# Patient Record
Sex: FEMALE | Race: BLACK OR AFRICAN AMERICAN | NOT HISPANIC OR LATINO | Employment: OTHER | URBAN - METROPOLITAN AREA
[De-identification: names, ages, dates, MRNs, and addresses within clinical notes are randomized per-mention and may not be internally consistent; named-entity substitution may affect disease eponyms.]

---

## 2017-08-02 ENCOUNTER — HISTORICAL (OUTPATIENT)
Dept: WOUND CARE | Facility: HOSPITAL | Age: 37
End: 2017-08-02

## 2017-08-02 LAB — B-HCG FREE SERPL-ACNC: 2 MIU/ML

## 2018-03-27 ENCOUNTER — HOSPITAL ENCOUNTER (OUTPATIENT)
Dept: ONCOLOGY | Facility: HOSPITAL | Age: 38
End: 2018-03-29
Attending: INTERNAL MEDICINE | Admitting: INTERNAL MEDICINE

## 2018-03-27 LAB
ABS NEUT (OLG): 6.28 X10(3)/MCL (ref 2.1–9.2)
ALBUMIN SERPL-MCNC: 3.5 GM/DL (ref 3.4–5)
ALBUMIN/GLOB SERPL: 1.1 RATIO (ref 1.1–2)
ALP SERPL-CCNC: 74 UNIT/L (ref 38–126)
ALT SERPL-CCNC: 14 UNIT/L (ref 12–78)
AMPHET UR QL SCN: NORMAL
APTT PPP: 29.6 SECOND(S) (ref 24.8–36.9)
AST SERPL-CCNC: 12 UNIT/L (ref 15–37)
BARBITURATE SCN PRESENT UR: NORMAL
BASOPHILS # BLD AUTO: 0 X10(3)/MCL (ref 0–0.2)
BASOPHILS NFR BLD AUTO: 0 %
BENZODIAZ UR QL SCN: NORMAL
BILIRUB SERPL-MCNC: 0.3 MG/DL (ref 0.2–1)
BILIRUBIN DIRECT+TOT PNL SERPL-MCNC: 0.1 MG/DL (ref 0–0.5)
BILIRUBIN DIRECT+TOT PNL SERPL-MCNC: 0.2 MG/DL (ref 0–0.8)
BNP BLD-MCNC: <5 PG/ML (ref 0–125)
BUN SERPL-MCNC: 10 MG/DL (ref 7–18)
CALCIUM SERPL-MCNC: 9.1 MG/DL (ref 8.5–10.1)
CANNABINOIDS UR QL SCN: NORMAL
CHLORIDE SERPL-SCNC: 100 MMOL/L (ref 98–107)
CO2 SERPL-SCNC: 30 MMOL/L (ref 21–32)
COCAINE UR QL SCN: NORMAL
CREAT SERPL-MCNC: 0.88 MG/DL (ref 0.55–1.02)
D DIMER PPP IA.FEU-MCNC: 763 NG/ML FEU (ref 0–500)
EOSINOPHIL # BLD AUTO: 0.2 X10(3)/MCL (ref 0–0.9)
EOSINOPHIL NFR BLD AUTO: 3 %
ERYTHROCYTE [DISTWIDTH] IN BLOOD BY AUTOMATED COUNT: 13.5 % (ref 11.5–17)
GLOBULIN SER-MCNC: 3.1 GM/DL (ref 2.4–3.5)
GLUCOSE SERPL-MCNC: 143 MG/DL (ref 74–106)
HCT VFR BLD AUTO: 38.6 % (ref 37–47)
HGB BLD-MCNC: 12.4 GM/DL (ref 12–16)
INR PPP: 0.96 (ref 0–1.27)
LYMPHOCYTES # BLD AUTO: 1.8 X10(3)/MCL (ref 0.6–4.6)
LYMPHOCYTES NFR BLD AUTO: 20 %
MCH RBC QN AUTO: 26.1 PG (ref 27–31)
MCHC RBC AUTO-ENTMCNC: 32.1 GM/DL (ref 33–36)
MCV RBC AUTO: 81.1 FL (ref 80–94)
MONOCYTES # BLD AUTO: 0.6 X10(3)/MCL (ref 0.1–1.3)
MONOCYTES NFR BLD AUTO: 7 %
NEUTROPHILS # BLD AUTO: 6.28 X10(3)/MCL (ref 1.4–7.9)
NEUTROPHILS NFR BLD AUTO: 70 %
OPIATES UR QL SCN: NORMAL
PCP UR QL: NORMAL
PH UR STRIP.AUTO: 6.5 [PH] (ref 5–7.5)
PLATELET # BLD AUTO: 231 X10(3)/MCL (ref 130–400)
PMV BLD AUTO: 10.1 FL (ref 9.4–12.4)
POC TROPONIN: 0 NG/ML (ref 0–0.08)
POTASSIUM SERPL-SCNC: 3.6 MMOL/L (ref 3.5–5.1)
PROT SERPL-MCNC: 6.6 GM/DL (ref 6.4–8.2)
PROTHROMBIN TIME: 13.1 SECOND(S) (ref 12.2–14.7)
RBC # BLD AUTO: 4.76 X10(6)/MCL (ref 4.2–5.4)
SODIUM SERPL-SCNC: 136 MMOL/L (ref 136–145)
SP GR FLD REFRACTOMETRY: 1.01 (ref 1–1.03)
WBC # SPEC AUTO: 8.9 X10(3)/MCL (ref 4.5–11.5)

## 2018-12-20 ENCOUNTER — HOSPITAL ENCOUNTER (OUTPATIENT)
Dept: NUTRITION | Facility: HOSPITAL | Age: 38
End: 2018-12-22
Attending: INTERNAL MEDICINE | Admitting: INTERNAL MEDICINE

## 2018-12-20 LAB
ABS NEUT (OLG): 6.32 X10(3)/MCL (ref 2.1–9.2)
ALBUMIN SERPL-MCNC: 3.2 GM/DL (ref 3.4–5)
ALBUMIN/GLOB SERPL: 0.9 RATIO (ref 1.1–2)
ALP SERPL-CCNC: 76 UNIT/L (ref 38–126)
ALT SERPL-CCNC: 28 UNIT/L (ref 12–78)
AMPHET UR QL SCN: NORMAL
AST SERPL-CCNC: 24 UNIT/L (ref 15–37)
B-HCG SERPL QL: NEGATIVE
BARBITURATE SCN PRESENT UR: NORMAL
BASOPHILS # BLD AUTO: 0 X10(3)/MCL (ref 0–0.2)
BASOPHILS NFR BLD AUTO: 0 %
BENZODIAZ UR QL SCN: NORMAL
BILIRUB SERPL-MCNC: 0.3 MG/DL (ref 0.2–1)
BILIRUBIN DIRECT+TOT PNL SERPL-MCNC: 0.1 MG/DL (ref 0–0.5)
BILIRUBIN DIRECT+TOT PNL SERPL-MCNC: 0.2 MG/DL (ref 0–0.8)
BNP BLD-MCNC: <15 PG/ML (ref 0–100)
BNP BLD-MCNC: <15 PG/ML (ref 0–100)
BUN SERPL-MCNC: 5 MG/DL (ref 7–18)
CALCIUM SERPL-MCNC: 8.9 MG/DL (ref 8.5–10.1)
CANNABINOIDS UR QL SCN: NORMAL
CHLORIDE SERPL-SCNC: 100 MMOL/L (ref 98–107)
CO2 SERPL-SCNC: 27 MMOL/L (ref 21–32)
COCAINE UR QL SCN: NORMAL
CREAT SERPL-MCNC: 0.82 MG/DL (ref 0.55–1.02)
EOSINOPHIL # BLD AUTO: 0.2 X10(3)/MCL (ref 0–0.9)
EOSINOPHIL NFR BLD AUTO: 3 %
ERYTHROCYTE [DISTWIDTH] IN BLOOD BY AUTOMATED COUNT: 13.8 % (ref 11.5–17)
GLOBULIN SER-MCNC: 3.4 GM/DL (ref 2.4–3.5)
GLUCOSE SERPL-MCNC: 169 MG/DL (ref 74–106)
HCT VFR BLD AUTO: 39.2 % (ref 37–47)
HGB BLD-MCNC: 12.5 GM/DL (ref 12–16)
LIPASE SERPL-CCNC: 44 UNIT/L (ref 73–393)
LYMPHOCYTES # BLD AUTO: 1 X10(3)/MCL (ref 0.6–4.6)
LYMPHOCYTES NFR BLD AUTO: 12 %
MCH RBC QN AUTO: 25.9 PG (ref 27–31)
MCHC RBC AUTO-ENTMCNC: 31.9 GM/DL (ref 33–36)
MCV RBC AUTO: 81.2 FL (ref 80–94)
MONOCYTES # BLD AUTO: 0.3 X10(3)/MCL (ref 0.1–1.3)
MONOCYTES NFR BLD AUTO: 3 %
NEUTROPHILS # BLD AUTO: 6.32 X10(3)/MCL (ref 2.1–9.2)
NEUTROPHILS NFR BLD AUTO: 81 %
OPIATES UR QL SCN: NORMAL
PCP UR QL: NORMAL
PH UR STRIP.AUTO: 6 [PH] (ref 5–7.5)
PLATELET # BLD AUTO: 273 X10(3)/MCL (ref 130–400)
PMV BLD AUTO: 9.9 FL (ref 9.4–12.4)
POTASSIUM SERPL-SCNC: 3.8 MMOL/L (ref 3.5–5.1)
PROT SERPL-MCNC: 6.6 GM/DL (ref 6.4–8.2)
RBC # BLD AUTO: 4.83 X10(6)/MCL (ref 4.2–5.4)
SODIUM SERPL-SCNC: 138 MMOL/L (ref 136–145)
SP GR FLD REFRACTOMETRY: 1.03 (ref 1–1.03)
WBC # SPEC AUTO: 7.8 X10(3)/MCL (ref 4.5–11.5)

## 2018-12-22 LAB
ABS NEUT (OLG): 8.01 X10(3)/MCL (ref 2.1–9.2)
ALBUMIN SERPL-MCNC: 3.1 GM/DL (ref 3.4–5)
ALBUMIN/GLOB SERPL: 1.2 {RATIO}
ALP SERPL-CCNC: 89 UNIT/L (ref 38–126)
ALT SERPL-CCNC: 19 UNIT/L (ref 12–78)
AST SERPL-CCNC: 6 UNIT/L (ref 15–37)
BASOPHILS # BLD AUTO: 0 X10(3)/MCL (ref 0–0.2)
BASOPHILS NFR BLD AUTO: 0 %
BILIRUB SERPL-MCNC: 0.2 MG/DL (ref 0.2–1)
BILIRUBIN DIRECT+TOT PNL SERPL-MCNC: 0.1 MG/DL (ref 0–0.2)
BILIRUBIN DIRECT+TOT PNL SERPL-MCNC: 0.1 MG/DL (ref 0–0.8)
BUN SERPL-MCNC: 15 MG/DL (ref 7–18)
CALCIUM SERPL-MCNC: 8.9 MG/DL (ref 8.5–10.1)
CHLORIDE SERPL-SCNC: 105 MMOL/L (ref 98–107)
CO2 SERPL-SCNC: 24 MMOL/L (ref 21–32)
CREAT SERPL-MCNC: 0.96 MG/DL (ref 0.55–1.02)
EOSINOPHIL # BLD AUTO: 0 X10(3)/MCL (ref 0–0.9)
EOSINOPHIL NFR BLD AUTO: 0 %
ERYTHROCYTE [DISTWIDTH] IN BLOOD BY AUTOMATED COUNT: 14.5 % (ref 11.5–17)
EST. AVERAGE GLUCOSE BLD GHB EST-MCNC: 183 MG/DL
GLOBULIN SER-MCNC: 2.6 GM/DL (ref 2.4–3.5)
GLUCOSE SERPL-MCNC: 199 MG/DL (ref 74–106)
HBA1C MFR BLD: 8 % (ref 4.2–6.3)
HCT VFR BLD AUTO: 34.5 % (ref 37–47)
HGB BLD-MCNC: 10.7 GM/DL (ref 12–16)
LYMPHOCYTES # BLD AUTO: 2.5 X10(3)/MCL (ref 0.6–4.6)
LYMPHOCYTES NFR BLD AUTO: 22 %
MCH RBC QN AUTO: 25.6 PG (ref 27–31)
MCHC RBC AUTO-ENTMCNC: 31 GM/DL (ref 33–36)
MCV RBC AUTO: 82.5 FL (ref 80–94)
MONOCYTES # BLD AUTO: 0.7 X10(3)/MCL (ref 0.1–1.3)
MONOCYTES NFR BLD AUTO: 6 %
NEUTROPHILS # BLD AUTO: 8.01 X10(3)/MCL (ref 2.1–9.2)
NEUTROPHILS NFR BLD AUTO: 71 %
PLATELET # BLD AUTO: 269 X10(3)/MCL (ref 130–400)
PMV BLD AUTO: 10.8 FL (ref 9.4–12.4)
POTASSIUM SERPL-SCNC: 3.7 MMOL/L (ref 3.5–5.1)
PROT SERPL-MCNC: 5.7 GM/DL (ref 6.4–8.2)
RBC # BLD AUTO: 4.18 X10(6)/MCL (ref 4.2–5.4)
SODIUM SERPL-SCNC: 138 MMOL/L (ref 136–145)
WBC # SPEC AUTO: 11.3 X10(3)/MCL (ref 4.5–11.5)

## 2019-01-13 ENCOUNTER — HOSPITAL ENCOUNTER (OUTPATIENT)
Dept: OCCUPATIONAL THERAPY | Facility: HOSPITAL | Age: 39
End: 2019-01-15
Attending: INTERNAL MEDICINE | Admitting: INTERNAL MEDICINE

## 2019-01-13 LAB
ABS NEUT (OLG): 4.23 X10(3)/MCL (ref 2.1–9.2)
ALBUMIN SERPL-MCNC: 3.4 GM/DL (ref 3.4–5)
ALBUMIN/GLOB SERPL: 1 {RATIO}
ALP SERPL-CCNC: 76 UNIT/L (ref 38–126)
ALT SERPL-CCNC: 23 UNIT/L (ref 12–78)
AST SERPL-CCNC: 15 UNIT/L (ref 15–37)
BASOPHILS # BLD AUTO: 0 X10(3)/MCL (ref 0–0.2)
BASOPHILS NFR BLD AUTO: 0 %
BILIRUB SERPL-MCNC: 0.3 MG/DL (ref 0.2–1)
BILIRUBIN DIRECT+TOT PNL SERPL-MCNC: 0.1 MG/DL (ref 0–0.2)
BILIRUBIN DIRECT+TOT PNL SERPL-MCNC: 0.2 MG/DL (ref 0–0.8)
BNP BLD-MCNC: <15 PG/ML (ref 0–100)
BUN SERPL-MCNC: 5 MG/DL (ref 7–18)
CALCIUM SERPL-MCNC: 8.4 MG/DL (ref 8.5–10.1)
CHLORIDE SERPL-SCNC: 105 MMOL/L (ref 98–107)
CO2 SERPL-SCNC: 26 MMOL/L (ref 21–32)
CREAT SERPL-MCNC: 0.86 MG/DL (ref 0.55–1.02)
EOSINOPHIL # BLD AUTO: 0.3 X10(3)/MCL (ref 0–0.9)
EOSINOPHIL NFR BLD AUTO: 4 %
ERYTHROCYTE [DISTWIDTH] IN BLOOD BY AUTOMATED COUNT: 14.3 % (ref 11.5–17)
GLOBULIN SER-MCNC: 3.5 GM/DL (ref 2.4–3.5)
GLUCOSE SERPL-MCNC: 153 MG/DL (ref 74–106)
HCO3 UR-SCNC: 21.7 MMOL/L (ref 22–26)
HCT VFR BLD AUTO: 38.1 % (ref 37–47)
HGB BLD-MCNC: 12.1 GM/DL (ref 12–16)
LYMPHOCYTES # BLD AUTO: 2.2 X10(3)/MCL (ref 0.6–4.6)
LYMPHOCYTES NFR BLD AUTO: 30 %
MCH RBC QN AUTO: 25.9 PG (ref 27–31)
MCHC RBC AUTO-ENTMCNC: 31.8 GM/DL (ref 33–36)
MCV RBC AUTO: 81.4 FL (ref 80–94)
MONOCYTES # BLD AUTO: 0.6 X10(3)/MCL (ref 0.1–1.3)
MONOCYTES NFR BLD AUTO: 8 %
NEUTROPHILS # BLD AUTO: 4.23 X10(3)/MCL (ref 2.1–9.2)
NEUTROPHILS NFR BLD AUTO: 57 %
O2 HGB ARTERIAL: 97.2 % (ref 94–97)
PCO2 BLDA: 26 MMHG (ref 35–45)
PH SMN: 7.53 [PH] (ref 7.35–7.45)
PLATELET # BLD AUTO: 271 X10(3)/MCL (ref 130–400)
PMV BLD AUTO: 10.6 FL (ref 9.4–12.4)
PO2 BLDA: 134 MMHG (ref 80–100)
POC ALLENS TEST: ABNORMAL
POC BE: 0.1 (ref -2–3)
POC CAO2: 16.9 ML/DL (ref 15.7–21.6)
POC CO HGB: 1.4 %
POC CO2: 22.5 MMOL/L (ref 22–27)
POC IONIZED CALCIUM: 1.13 MMOL/L (ref 1.12–1.23)
POC MET HGB: 1.2 % (ref 0.4–1.5)
POC SAMPLESOURCE: ABNORMAL
POC SATURATED O2: 99.3 % (ref 96–97)
POC SITE: ABNORMAL
POC THB: 12.2 GM/DL (ref 12–16)
POC TREATMENT: ABNORMAL
POC TROPONIN: 0 NG/ML (ref 0–0.08)
POTASSIUM BLD-SCNC: 3 MMOL/L (ref 3.6–5)
POTASSIUM SERPL-SCNC: 3.2 MMOL/L (ref 3.5–5.1)
PROT SERPL-MCNC: 6.9 GM/DL (ref 6.4–8.2)
RBC # BLD AUTO: 4.68 X10(6)/MCL (ref 4.2–5.4)
SETTING 1 ABG: ABNORMAL
SETTING 2 ABG: ABNORMAL
SETTING 3 ABG: ABNORMAL
SETTING 4 ABG: ABNORMAL
SODIUM BLD-SCNC: 137 MMOL/L (ref 137–145)
SODIUM SERPL-SCNC: 139 MMOL/L (ref 136–145)
WBC # SPEC AUTO: 7.4 X10(3)/MCL (ref 4.5–11.5)

## 2019-01-14 LAB
ALBUMIN SERPL-MCNC: 3.3 GM/DL (ref 3.4–5)
ALBUMIN/GLOB SERPL: 0.9 RATIO (ref 1.1–2)
ALP SERPL-CCNC: 74 UNIT/L (ref 38–126)
ALT SERPL-CCNC: 27 UNIT/L (ref 12–78)
AST SERPL-CCNC: 14 UNIT/L (ref 15–37)
BILIRUB SERPL-MCNC: 0.2 MG/DL (ref 0.2–1)
BILIRUBIN DIRECT+TOT PNL SERPL-MCNC: 0.1 MG/DL (ref 0–0.5)
BILIRUBIN DIRECT+TOT PNL SERPL-MCNC: 0.1 MG/DL (ref 0–0.8)
BUN SERPL-MCNC: 8 MG/DL (ref 7–18)
CALCIUM SERPL-MCNC: 9.2 MG/DL (ref 8.5–10.1)
CHLORIDE SERPL-SCNC: 106 MMOL/L (ref 98–107)
CO2 SERPL-SCNC: 23 MMOL/L (ref 21–32)
CREAT SERPL-MCNC: 0.86 MG/DL (ref 0.55–1.02)
ERYTHROCYTE [DISTWIDTH] IN BLOOD BY AUTOMATED COUNT: 14.4 % (ref 11.5–17)
GLOBULIN SER-MCNC: 3.5 GM/DL (ref 2.4–3.5)
GLUCOSE SERPL-MCNC: 224 MG/DL (ref 74–106)
H PYLORI AB SER IA-ACNC: NEGATIVE
HCT VFR BLD AUTO: 38 % (ref 37–47)
HGB BLD-MCNC: 11.5 GM/DL (ref 12–16)
MCH RBC QN AUTO: 25.6 PG (ref 27–31)
MCHC RBC AUTO-ENTMCNC: 30.3 GM/DL (ref 33–36)
MCV RBC AUTO: 84.4 FL (ref 80–94)
PLATELET # BLD AUTO: 283 X10(3)/MCL (ref 130–400)
PMV BLD AUTO: 11.4 FL (ref 9.4–12.4)
POTASSIUM SERPL-SCNC: 4.5 MMOL/L (ref 3.5–5.1)
PROT SERPL-MCNC: 6.8 GM/DL (ref 6.4–8.2)
RBC # BLD AUTO: 4.5 X10(6)/MCL (ref 4.2–5.4)
SODIUM SERPL-SCNC: 138 MMOL/L (ref 136–145)
WBC # SPEC AUTO: 10.2 X10(3)/MCL (ref 4.5–11.5)

## 2019-01-15 LAB
BUN SERPL-MCNC: 14 MG/DL (ref 7–18)
CALCIUM SERPL-MCNC: 9.9 MG/DL (ref 8.5–10.1)
CHLORIDE SERPL-SCNC: 105 MMOL/L (ref 98–107)
CO2 SERPL-SCNC: 26 MMOL/L (ref 21–32)
CREAT SERPL-MCNC: 0.97 MG/DL (ref 0.55–1.02)
CREAT/UREA NIT SERPL: 14.4
GLUCOSE SERPL-MCNC: 194 MG/DL (ref 74–106)
POTASSIUM SERPL-SCNC: 4.5 MMOL/L (ref 3.5–5.1)
SODIUM SERPL-SCNC: 140 MMOL/L (ref 136–145)

## 2019-03-31 ENCOUNTER — HOSPITAL ENCOUNTER (OUTPATIENT)
Dept: MEDSURG UNIT | Facility: HOSPITAL | Age: 39
End: 2019-04-02
Attending: INTERNAL MEDICINE | Admitting: INTERNAL MEDICINE

## 2019-03-31 LAB
ABS NEUT (OLG): 4.52 X10(3)/MCL (ref 2.1–9.2)
ALBUMIN SERPL-MCNC: 3.4 GM/DL (ref 3.4–5)
ALBUMIN/GLOB SERPL: 1 RATIO (ref 1.1–2)
ALP SERPL-CCNC: 106 UNIT/L (ref 38–126)
ALT SERPL-CCNC: 17 UNIT/L (ref 12–78)
APPEARANCE, UA: ABNORMAL
AST SERPL-CCNC: 10 UNIT/L (ref 15–37)
B-HCG FREE SERPL-ACNC: <1 MIU/ML
B-HCG SERPL QL: NEGATIVE
BACTERIA SPEC CULT: ABNORMAL /HPF
BASOPHILS # BLD AUTO: 0 X10(3)/MCL (ref 0–0.2)
BASOPHILS NFR BLD AUTO: 0 %
BILIRUB SERPL-MCNC: 0.2 MG/DL (ref 0.2–1)
BILIRUB UR QL STRIP: ABNORMAL
BILIRUBIN DIRECT+TOT PNL SERPL-MCNC: 0.1 MG/DL (ref 0–0.5)
BILIRUBIN DIRECT+TOT PNL SERPL-MCNC: 0.1 MG/DL (ref 0–0.8)
BUN SERPL-MCNC: 6 MG/DL (ref 7–18)
CALCIUM SERPL-MCNC: 8.4 MG/DL (ref 8.5–10.1)
CHLORIDE SERPL-SCNC: 107 MMOL/L (ref 98–107)
CO2 SERPL-SCNC: 24 MMOL/L (ref 21–32)
COLOR UR: ABNORMAL
CREAT SERPL-MCNC: 0.89 MG/DL (ref 0.55–1.02)
EOSINOPHIL # BLD AUTO: 0.3 X10(3)/MCL (ref 0–0.9)
EOSINOPHIL NFR BLD AUTO: 4 %
ERYTHROCYTE [DISTWIDTH] IN BLOOD BY AUTOMATED COUNT: 14.8 % (ref 11.5–17)
GLOBULIN SER-MCNC: 3.5 GM/DL (ref 2.4–3.5)
GLUCOSE (UA): NEGATIVE
GLUCOSE SERPL-MCNC: 147 MG/DL (ref 74–106)
HCT VFR BLD AUTO: 37.9 % (ref 37–47)
HGB BLD-MCNC: 11.9 GM/DL (ref 12–16)
HGB UR QL STRIP: ABNORMAL
KETONES UR QL STRIP: NEGATIVE
LEUKOCYTE ESTERASE UR QL STRIP: ABNORMAL
LYMPHOCYTES # BLD AUTO: 1.4 X10(3)/MCL (ref 0.6–4.6)
LYMPHOCYTES NFR BLD AUTO: 20 %
MCH RBC QN AUTO: 25.8 PG (ref 27–31)
MCHC RBC AUTO-ENTMCNC: 31.4 GM/DL (ref 33–36)
MCV RBC AUTO: 82 FL (ref 80–94)
MONOCYTES # BLD AUTO: 0.4 X10(3)/MCL (ref 0.1–1.3)
MONOCYTES NFR BLD AUTO: 6 %
MUCOUS THREADS URNS QL MICRO: SLIGHT
NEUTROPHILS # BLD AUTO: 4.52 X10(3)/MCL (ref 2.1–9.2)
NEUTROPHILS NFR BLD AUTO: 68 %
NITRITE UR QL STRIP: NEGATIVE
PH UR STRIP: 5 [PH] (ref 5–9)
PLATELET # BLD AUTO: 214 X10(3)/MCL (ref 130–400)
PMV BLD AUTO: 10.9 FL (ref 9.4–12.4)
POTASSIUM SERPL-SCNC: 3.5 MMOL/L (ref 3.5–5.1)
PROT SERPL-MCNC: 6.9 GM/DL (ref 6.4–8.2)
PROT UR QL STRIP: ABNORMAL
RBC # BLD AUTO: 4.62 X10(6)/MCL (ref 4.2–5.4)
RBC #/AREA URNS HPF: >200 /HPF
SODIUM SERPL-SCNC: 137 MMOL/L (ref 136–145)
SP GR UR STRIP: 1.02 (ref 1–1.03)
SQUAMOUS EPITHELIAL, UA: ABNORMAL
UA WBC MAN: ABNORMAL /HPF
UROBILINOGEN UR STRIP-ACNC: 0.2
WBC # SPEC AUTO: 6.6 X10(3)/MCL (ref 4.5–11.5)

## 2019-04-01 LAB
ABS NEUT (OLG): 5.01 X10(3)/MCL (ref 2.1–9.2)
BASOPHILS # BLD AUTO: 0 X10(3)/MCL (ref 0–0.2)
BASOPHILS NFR BLD AUTO: 0 %
BUN SERPL-MCNC: 7 MG/DL (ref 7–18)
CALCIUM SERPL-MCNC: 9.2 MG/DL (ref 8.5–10.1)
CHLORIDE SERPL-SCNC: 105 MMOL/L (ref 98–107)
CO2 SERPL-SCNC: 22 MMOL/L (ref 21–32)
CREAT SERPL-MCNC: 1.09 MG/DL (ref 0.55–1.02)
CREAT/UREA NIT SERPL: 6.4
ERYTHROCYTE [DISTWIDTH] IN BLOOD BY AUTOMATED COUNT: 14.7 % (ref 11.5–17)
GLUCOSE SERPL-MCNC: 297 MG/DL (ref 74–106)
HCT VFR BLD AUTO: 38.5 % (ref 37–47)
HGB BLD-MCNC: 11.9 GM/DL (ref 12–16)
LYMPHOCYTES # BLD AUTO: 0.4 X10(3)/MCL (ref 0.6–4.6)
LYMPHOCYTES NFR BLD AUTO: 7 %
MCH RBC QN AUTO: 25.4 PG (ref 27–31)
MCHC RBC AUTO-ENTMCNC: 30.9 GM/DL (ref 33–36)
MCV RBC AUTO: 82.3 FL (ref 80–94)
MONOCYTES # BLD AUTO: 0 X10(3)/MCL (ref 0.1–1.3)
MONOCYTES NFR BLD AUTO: 1 %
NEUTROPHILS # BLD AUTO: 5.01 X10(3)/MCL (ref 2.1–9.2)
NEUTROPHILS NFR BLD AUTO: 92 %
PLATELET # BLD AUTO: 214 X10(3)/MCL (ref 130–400)
PMV BLD AUTO: 11.6 FL (ref 9.4–12.4)
POTASSIUM SERPL-SCNC: 4.8 MMOL/L (ref 3.5–5.1)
RBC # BLD AUTO: 4.68 X10(6)/MCL (ref 4.2–5.4)
SODIUM SERPL-SCNC: 137 MMOL/L (ref 136–145)
WBC # SPEC AUTO: 5.5 X10(3)/MCL (ref 4.5–11.5)

## 2019-04-03 LAB — FINAL CULTURE: NORMAL

## 2020-03-02 ENCOUNTER — HOSPITAL ENCOUNTER (OUTPATIENT)
Dept: OCCUPATIONAL THERAPY | Facility: HOSPITAL | Age: 40
End: 2020-03-03
Attending: INTERNAL MEDICINE | Admitting: INTERNAL MEDICINE

## 2020-03-02 LAB
ABS NEUT (OLG): 4.16 X10(3)/MCL (ref 2.1–9.2)
ALBUMIN SERPL-MCNC: 3.2 GM/DL (ref 3.4–5)
ALBUMIN/GLOB SERPL: 0.9 RATIO (ref 1.1–2)
ALP SERPL-CCNC: 89 UNIT/L (ref 38–126)
ALT SERPL-CCNC: 19 UNIT/L (ref 12–78)
AMYLASE SERPL-CCNC: 57 UNIT/L (ref 25–115)
APPEARANCE, UA: CLEAR
AST SERPL-CCNC: 20 UNIT/L (ref 15–37)
B-HCG SERPL QL: NEGATIVE
BACTERIA SPEC CULT: ABNORMAL /HPF
BASOPHILS # BLD AUTO: 0 X10(3)/MCL (ref 0–0.2)
BASOPHILS NFR BLD AUTO: 1 %
BILIRUB SERPL-MCNC: 0.3 MG/DL (ref 0.2–1)
BILIRUB UR QL STRIP: NEGATIVE
BILIRUBIN DIRECT+TOT PNL SERPL-MCNC: 0.1 MG/DL (ref 0–0.5)
BILIRUBIN DIRECT+TOT PNL SERPL-MCNC: 0.2 MG/DL (ref 0–0.8)
BUN SERPL-MCNC: 13 MG/DL (ref 7–18)
CALCIUM SERPL-MCNC: 8.7 MG/DL (ref 8.5–10.1)
CHLORIDE SERPL-SCNC: 106 MMOL/L (ref 98–107)
CO2 SERPL-SCNC: 24 MMOL/L (ref 21–32)
COLOR UR: YELLOW
CREAT SERPL-MCNC: 0.86 MG/DL (ref 0.55–1.02)
EOSINOPHIL # BLD AUTO: 0.3 X10(3)/MCL (ref 0–0.9)
EOSINOPHIL NFR BLD AUTO: 4 %
ERYTHROCYTE [DISTWIDTH] IN BLOOD BY AUTOMATED COUNT: 15.9 % (ref 11.5–17)
GLOBULIN SER-MCNC: 3.7 GM/DL (ref 2.4–3.5)
GLUCOSE (UA): NEGATIVE
GLUCOSE SERPL-MCNC: 165 MG/DL (ref 74–106)
HCT VFR BLD AUTO: 38.2 % (ref 37–47)
HGB BLD-MCNC: 11.5 GM/DL (ref 12–16)
HGB UR QL STRIP: NEGATIVE
KETONES UR QL STRIP: NEGATIVE
LEUKOCYTE ESTERASE UR QL STRIP: NEGATIVE
LIPASE SERPL-CCNC: 81 UNIT/L (ref 73–393)
LYMPHOCYTES # BLD AUTO: 2.1 X10(3)/MCL (ref 0.6–4.6)
LYMPHOCYTES NFR BLD AUTO: 30 %
MCH RBC QN AUTO: 24.9 PG (ref 27–31)
MCHC RBC AUTO-ENTMCNC: 30.1 GM/DL (ref 33–36)
MCV RBC AUTO: 82.7 FL (ref 80–94)
MONOCYTES # BLD AUTO: 0.5 X10(3)/MCL (ref 0.1–1.3)
MONOCYTES NFR BLD AUTO: 7 %
NEUTROPHILS # BLD AUTO: 4.16 X10(3)/MCL (ref 2.1–9.2)
NEUTROPHILS NFR BLD AUTO: 58 %
NITRITE UR QL STRIP: NEGATIVE
PH UR STRIP: 6 [PH] (ref 5–9)
PLATELET # BLD AUTO: 219 X10(3)/MCL (ref 130–400)
PMV BLD AUTO: 10.9 FL (ref 9.4–12.4)
POTASSIUM SERPL-SCNC: 3.8 MMOL/L (ref 3.5–5.1)
PROT SERPL-MCNC: 6.9 GM/DL (ref 6.4–8.2)
PROT UR QL STRIP: NEGATIVE
RBC # BLD AUTO: 4.62 X10(6)/MCL (ref 4.2–5.4)
RBC #/AREA URNS HPF: ABNORMAL /[HPF]
SODIUM SERPL-SCNC: 137 MMOL/L (ref 136–145)
SP GR UR STRIP: 1.02 (ref 1–1.03)
SQUAMOUS EPITHELIAL, UA: ABNORMAL
UROBILINOGEN UR STRIP-ACNC: 1
WBC # SPEC AUTO: 7.1 X10(3)/MCL (ref 4.5–11.5)
WBC #/AREA URNS HPF: ABNORMAL /[HPF]

## 2020-03-03 LAB
ABS NEUT (OLG): 4.77 X10(3)/MCL (ref 2.1–9.2)
ALBUMIN SERPL-MCNC: 3.3 GM/DL (ref 3.4–5)
ALBUMIN/GLOB SERPL: 1.1 RATIO (ref 1.1–2)
ALP SERPL-CCNC: 133 UNIT/L (ref 38–126)
ALT SERPL-CCNC: 174 UNIT/L (ref 12–78)
AST SERPL-CCNC: 167 UNIT/L (ref 15–37)
BASOPHILS # BLD AUTO: 0 X10(3)/MCL (ref 0–0.2)
BASOPHILS NFR BLD AUTO: 0 %
BILIRUB SERPL-MCNC: 0.4 MG/DL (ref 0.2–1)
BILIRUBIN DIRECT+TOT PNL SERPL-MCNC: 0.1 MG/DL (ref 0–0.5)
BILIRUBIN DIRECT+TOT PNL SERPL-MCNC: 0.3 MG/DL (ref 0–0.8)
BUN SERPL-MCNC: 7 MG/DL (ref 7–18)
CALCIUM SERPL-MCNC: 8.9 MG/DL (ref 8.5–10.1)
CHLORIDE SERPL-SCNC: 105 MMOL/L (ref 98–107)
CO2 SERPL-SCNC: 28 MMOL/L (ref 21–32)
CREAT SERPL-MCNC: 0.78 MG/DL (ref 0.55–1.02)
EOSINOPHIL # BLD AUTO: 0.1 X10(3)/MCL (ref 0–0.9)
EOSINOPHIL NFR BLD AUTO: 1 %
ERYTHROCYTE [DISTWIDTH] IN BLOOD BY AUTOMATED COUNT: 15.9 % (ref 11.5–17)
GLOBULIN SER-MCNC: 3.1 GM/DL (ref 2.4–3.5)
GLUCOSE SERPL-MCNC: 117 MG/DL (ref 74–106)
HCT VFR BLD AUTO: 35.8 % (ref 37–47)
HGB BLD-MCNC: 10.7 GM/DL (ref 12–16)
LYMPHOCYTES # BLD AUTO: 1 X10(3)/MCL (ref 0.6–4.6)
LYMPHOCYTES NFR BLD AUTO: 16 %
MCH RBC QN AUTO: 24.7 PG (ref 27–31)
MCHC RBC AUTO-ENTMCNC: 29.9 GM/DL (ref 33–36)
MCV RBC AUTO: 82.7 FL (ref 80–94)
MONOCYTES # BLD AUTO: 0.4 X10(3)/MCL (ref 0.1–1.3)
MONOCYTES NFR BLD AUTO: 6 %
NEUTROPHILS # BLD AUTO: 4.77 X10(3)/MCL (ref 2.1–9.2)
NEUTROPHILS NFR BLD AUTO: 76 %
PLATELET # BLD AUTO: 232 X10(3)/MCL (ref 130–400)
PMV BLD AUTO: 10.8 FL (ref 9.4–12.4)
POTASSIUM SERPL-SCNC: 3.7 MMOL/L (ref 3.5–5.1)
PROT SERPL-MCNC: 6.4 GM/DL (ref 6.4–8.2)
RBC # BLD AUTO: 4.33 X10(6)/MCL (ref 4.2–5.4)
SODIUM SERPL-SCNC: 139 MMOL/L (ref 136–145)
WBC # SPEC AUTO: 6.3 X10(3)/MCL (ref 4.5–11.5)

## 2020-03-04 LAB — FINAL CULTURE: NORMAL

## 2022-04-11 ENCOUNTER — HISTORICAL (OUTPATIENT)
Dept: ADMINISTRATIVE | Facility: HOSPITAL | Age: 42
End: 2022-04-11

## 2022-04-25 VITALS
DIASTOLIC BLOOD PRESSURE: 90 MMHG | WEIGHT: 293 LBS | BODY MASS INDEX: 51.91 KG/M2 | SYSTOLIC BLOOD PRESSURE: 143 MMHG | HEIGHT: 63 IN
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
## 2022-04-29 NOTE — H&P
"Hospitalist      Patient:   Danyelle Chi            MRN: [id number]            FIN: [id number]               Age:   38 years     Sex:  Female     :  1980   Associated Diagnoses:   None   Author:   Danyelle Bradford MD      Basic Information   Time Seen:  Date & Time 2018 15:06:00.    Referral source:  Emergency department.    History limitation:  None.    Advance directive:  None.    Provider information/ cc:  Primary care:  Chuy Langford DO       Chief Complaint   SOB, Abdominal pain,       History of Present Illness   Ms. Chi is a 38 yr old AAF who presented to the ED with c/o abdominal pain. Reports a 2 day history of progressively worsening SOB, productive cough (yellow sputum) and wheezing. Usual home breathing treaments not working. Also reports a 2-3 week history of abdominal pain,  nausea and vomiting. Abdominal pain is intermittent, described as "throbbing" and without aggravating or alleviating factors. Reports 6-8 episodes of nonbloody vomiting per day. Denies diarrhea or constipation. Denies any fever or chills. B/P was elevated in ED at 171/118 but better after getting hydralazine. Also received Solumedrol, Mg sulfate, phenergan, and toradol. Continues to have some wheezing and c/o abdominal pain. Appears comfortable at this time. Playing on phone while obtaining history. Labs unremarkable. Chest xray clear.       Review of Systems   Except as documented, all other systems reviewed and negative      Health Status   Current medications:  (Selected)   Inpatient Medications  Ordered  Normal Saline (0.9% NS) IV 1,000 mL: 1,000 mL, 1,000 mL, IV, 85 mL/hr, start date 18 15:29:00 CST  Zofran 2 mg/mL injectable solution: 4 mg, form: Injection, IV Push, q4hr PRN for nausea, first dose 18 15:30:00 CST  albuterol 0.083% inhalation solution: 2.5 mg, form: Soln-Inh, NEB, q4hr Resp, first dose 18 16:00:00 CST  cloniDINE 0.2 mg oral tablet: 0.2 mg, form: Tab, Oral, Once, " first dose 11/30/16 15:13:00 CST, stop date 11/30/16 15:13:00 CST, STAT, 24  methylPREDNISolone sodium succinate 125 mg injection: 60 mg, form: Injection, IV Push, q8hr, first dose 12/20/18 22:00:00 CST  Prescriptions  Prescribed  Carafate 1 g/10 mL oral suspension: 1 gm = 10 mL, Oral, QID, # 560 mL, 0 Refill(s), Pharmacy: [id number]  omeprazole 40 mg oral DR capsule: 40 mg = 1 cap(s), Oral, Daily, # 30 cap(s), 0 Refill(s), Pharmacy: [id number]  Documented Medications  Documented  ALBUTEROL SULFATE .083 % NEBU: 2.5 mg = 3 mL, NEB, q6hr, PRN PRN as needed for wheezing  Arnuity Ellipta: INH, q24hr, 0 Refill(s)  amlodipine 10 mg oral tablet: 10 mg = 1 tab(s), Oral, Daily, # 30 tab(s), 0 Refill(s)     DID NOT BRING MEDS. WITH HER PERMISSION I CALLED HER PHARMACY AND SHE IS ON THE ABOVE MEDS. NORVASC NOT FILLED RECENTLY.       Histories     Past Medical History: Asthma, HTN, Morbid obesity  Past Surgical History: D&C  Family History: Negative in regards to HPI.  Social History:  No alcohol, tobacco or illicit drug use. LMP one month ago but otherwise none in last year. G14, P7, multiple miscarriages.           Physical Examination      Vital Signs (last 24 hrs)_____  Last Charted___________  Temp Oral     37.0 DegC  (DEC 20 10:17)  Heart Rate Peripheral   H 109bpm  (DEC 20 14:30)  Resp Rate         21 br/min  (DEC 20 14:30)  SBP      H 173mmHg  (DEC 20 14:30)  DBP      H 115mmHg  (DEC 20 14:30)  SpO2      96 %  (DEC 20 14:30)   General:  Alert and oriented, No acute distress, morbidly obese.    Cognition and Speech:  Oriented, Speech clear and coherent.    HENT:  Normocephalic, Normal hearing, Oral mucosa is moist.    Eye:  Pupils are equal, round and reactive to light, Normal conjunctiva.    Neck:  Supple, No carotid bruit, No jugular venous distention.    Respiratory:  Respirations are non-labored, Breath sounds are equal, bilateral wheezing.    Cardiovascular:  Normal rate, Regular rhythm, No  murmur, No edema.    Gastrointestinal:  Soft, Non-tender, Non-distended, Normal bowel sounds.    Integumentary:  Warm, Dry, Intact.    Musculoskeletal:  Normal strength, No tenderness, No swelling.    Neurologic:  Alert, Oriented, Normal sensory, No focal deficits.    Psychiatric:  Cooperative, Appropriate mood & affect, Normal judgment.       Review / Management   Laboratory Results   Today's Lab Results : PowerNote Discrete Results   12/20/2018 11:49 CST     POC BNP iSTAT             <15 pg/mL    12/20/2018 11:44 CST     WBC                       7.8 x10(3)/mcL                             Hgb                       12.5 gm/dL                             Hct                       39.2 %                             Platelet                  273 x10(3)/mcL                             MCV                       81.2 fL                             Sodium Lvl                138 mmol/L                             Potassium Lvl             3.8 mmol/L                             Chloride                  100 mmol/L                             CO2                       27.0 mmol/L                             Calcium Lvl               8.9 mg/dL                             Glucose Lvl               169 mg/dL  HI                             BUN                       5.0 mg/dL  LOW                             Creatinine                0.82 mg/dL                             Bili Total                0.3 mg/dL                             Bili Direct               0.10 mg/dL                             Bili Indirect             0.20 mg/dL                             AST                       24 unit/L                             ALT                       28 unit/L                             Alk Phos                  76 unit/L                             Albumin Lvl               3.20 gm/dL  LOW                   * Final Report *    Reason For Exam  Pneumonia    Radiology Report     CHEST X-RAYS (2 Views):     CPT: [id number]      HISTORY:  Pneumonia     FINDINGS:  Examination reveals mediastinal and cardiac silhouettes to be within  normal limits. Lung fields are clear and free of gross infiltrates  atelectases or effusions.     IMPRESSION: No active pulmonary disease       Signature Line  Electronically Signed By: Dimas Torres MD  Date/Time Signed: 12/20/2018 11:48         Impression and Plan     Asthma exacerbation   - Continue Solumedrol 60mg IV q 6 hr    Abdominal pain, nausea, vomiting  - Will keep NPO for now. If UPT negative will consider CT of abdomen/pelvis with contrast. Body habitus will likely interfere with results of an US.   - UDS pending    HTN - uncontrolled  - Will restart Norvasc 10mg daily  - Medication compliance reinforced    I, Kayley Borjas NP, discussed this case with Dr. IFRAH Bradford.     Patient seen and examined at 1715.  I have reviewed the documentation as above and agree with the plan.  Patient indicates that she is taken many different things to try to help with her abdominal pain but nothing is really helping.  She denies any problems with her bowel or bladder.  Exam as above with minimal wheezing on her lung exam and some moderate tenderness in the periumbilical region.    We will continue as above.  We will go ahead and give her some pain medication as well as a diet.  I indicated that she needed to back off of eating if she were to have any additional nausea and vomiting.  She indicates that she may have a history of gastroparesis although I do not see evidence of that.  I do see several visits in the past for abdominal pain that is similar.

## 2022-04-29 NOTE — DISCHARGE SUMMARY
"   Patient:   Danyelle Chi            MRN: [id number]            FIN: [id number]               Age:   38 years     Sex:  Female     :  1980   Associated Diagnoses:   None   Author:   Thanh Landaverde MD      Discharge Information      Discharge Summary Information   Admit/Discharge Dates   Admit Date: 2018  Discharge Date: 2018   Physicians   Attending Physician - Danyelle Bradford MD    Attending Physician - Akhil BOWEN, Thanh  Primary Care Physician - Chuy Langford DO   Discharge Diagnosis   Mild Asthma exacerbation: resolved      Abdominal pain: Non specific : resolved      HTN, benign    DM2, non insulin dependent    Morbid obesity      Procedures   No procedures recorded for this visit.   Immunizations   No immunizations recorded for this visit.   Discharge Medications   Prescribed  metFORMIN (metformin 500 mg oral tablet) 500 mg, Oral, BID  Continue  albuterol (ALBUTEROL SULFATE .083 % NEBU) 2.5 mg, NEB, q6hr, PRN as needed for wheezing  amLODIPine (amlodipine 10 mg oral tablet) 10 mg, Oral, Daily  fluticasone (Arnuity Ellipta), INH, q24hr  sucralfate (Carafate 1 g/10 mL oral suspension) 1 gm, Oral, QID  Discontinue  omeprazole (omeprazole 40 mg oral DR capsule) 40 mg, Oral, Daily      Education   Discharge - 18 8:11:00 CST, Home, Give all scheduled vaccinations prior to discharge.   Discharge Activity - Activity as Tolerated   Discharge Diet - Diabetic       Followup   Chuy Langford DO, within 1 to 2 weeks      Hospital Course   Hospital Course   Ms. Chi is a 38 yr old AAF who presented to the ED with c/o abdominal pain.  Abdominal pain is intermittent, described as "throbbing" and without aggravating or alleviating factors. Reports 6-8 episodes of nonbloody vomiting per day. Denies diarrhea or constipation. Reports a 2 day history of progressively worsening SOB, productive cough (yellow sputum) and wheezing. Usual home breathing treaments not working. Also " reports a 2-3 week history of abdominal pain,  nausea and vomiting. Denies any fever or chills. B/P was elevated in ED at 171/118 but better after getting hydralazine. Also received Solumedrol, Mg sulfate, phenergan, and toradol. Continues to have some wheezing and c/o abdominal pain. Appears comfortable at this time. Playing on phone while obtaining history. Labs unremarkable. Chest xray clear.   Pt was admitted to regular floor and started on iv filuds, IV steroids and bronchodilators. Her respiratory symptoms improved but she continued to C/O abdominal pain. Physical exam was inconclusive. So we got a CT abdomen done. Came back negative for acute changes. She started to tolerate po diet. She has been stable and asymptomatic. DC to home today. Informed pt about her new diagnosis of DM. HbA1c was 8. Started on Metformin 500 mg po BID and DM education was given.  Diet: DM  Meds: per dc med rec  F/U with PCP in 1 to 2 weeks   For further questions contact hospitalist office  DC 31 mts    .        Physical Examination   General:  Alert and oriented, No acute distress, Obese.    Respiratory:  Lungs are clear to auscultation, Breath sounds are equal.    Cardiovascular:  Normal rate, Regular rhythm, No murmur.    Gastrointestinal:  Soft, Non-tender, Non-distended, Normal bowel sounds.    Neurologic:  Alert, Oriented, No focal deficits, Cranial Nerves II-XII are grossly intact.       Discharge Plan   Education and Follow-up   Counseled: patient, regarding diagnosis, regarding treatment, regarding medications.

## 2022-04-29 NOTE — DISCHARGE SUMMARY
Patient:   Danyelle Chi            MRN: [id number]            FIN: [id number]               Age:   38 years     Sex:  Female     :  1980   Associated Diagnoses:   None   Author:   Scottie Holland MD      Admit Date: 2019  Discharge Date: 01/15/2019    PHYSICIANS  Attending Physician - Caroline BOWEN, Martine Ferrara  Attending Physician - Scottie Holland MD  Admitting Physician - Caroline BOWEN, Martine Ferrara  Primary Care Physician - No PCP, No    DISCHARGE DIAGNOSIS  Persistent asthma with acute exacerbation  Dysphagia to solids  NIDDM II  Accelerated HTN    PROCEDURES  No procedures recorded for this visit.    HOSPITAL COURSE  38-year-old -American female with a history of persistent asthma presented to the ED via EMS with complaints of shortness of breath, difficulty breathing.  Patient was seen in the ED on 1/10 for similar complaints, subsequently discharged home on steroids, which she states she completed.  Patient returned to ED on  again with SOB.  She did have significant wheezing, and was on supplemental oxygen for a short time in ED, though its was weaned off by time of admission, and she was saturating adequately on room air.  Chest x-ray revealed no evidence of acute disease.  Patient was admitted to hospital medicine services for further management.  She was continued on steroids and bronchodilators.  She was stable saturating well on room air.  She complained of chronic dysphagia, with worsening recently, and mostly with solids.  For this we peroformed an MBS which noted  Esophageal reflux to the level of the thoracic inlet.  Likely related to her frequent use of steroids for asthma.  She was started on PPI BID.  She was stable for discharge.  She was sent on short course of steroids.  She can follow up further with PCP.    STATUS  Improved    DISPOSITION  Discharge to home    DIET  [place]    ACTIVITY  As tolerated    IMMUNIZATIONS  01/15/2019 - influenza virus vaccine, inactivated      FOLLOW-UP  PCP, within 1 week   Call for followup appointment.  Hospital Follow-up.  If patient has no PCP please have CM assist him in setting this up.  Bhupendra Eason, on 01/22/2019    DISCHARGE MEDICATION RECONCILIATION  Prescribed  albuterol (albuterol 2.5 mg/3 mL (0.083%) inhalation solution) 2.5 mg, NEB, q6hr, PRN as needed for wheezing  hydroxyzine (Vistaril 50 mg oral capsule) 50 mg, Oral, QID, PRN as needed for anxiety  methylprednisolone (Medrol 4 mg oral tablet) 1 packet(s), Oral, Once  methylprednisolone (Medrol 4 mg oral tablet) 1 packet(s), Oral, Once  pantoprazole (Protonix 40 mg ORAL enteric coated tablet) 40 mg, Oral, BID  Continue  amlodipine (amlodipine 10 mg oral tablet) 10 mg, Oral, Daily  diphenhydramine (Benadryl)  fluticasone (Arnuity Ellipta), INH, q24hr  metformin (metformin 500 mg oral tablet) 500 mg, Oral, BID  montelukast (Singulair 10 mg oral TABLET) 10 mg, Oral, Daily  ondansetron (Zofran 4 mg oral tablet) 4 mg, Oral, q8hr, PRN as needed for nausea/vomiting  Discontinue  Template Non-Formulary Med    PHYSICAL EXAM  Temperature  36.8  (07:00)  Systolic Blood Pressure 163  (07:00)  Diastolic Blood Pressure No result  Pulse   86  (09:59)  SpO2   97  (07:00)  Respiratory Rate  20  (09:59)    GENERAL: awake and in no acute distress  LUNGS: Good air movement bilaterally, minimal end expiratory wheezing, no rhonchi  CVS: Widely tachycardic, S1-S2 positive, no murmurs  ABD: Soft, non-tender, bowel sounds present  EXTREMITIES: peripheral pulses 2+, no pitting edema  NEURO: AAOx3  PSYCHIATRIC: Cooperative      Time spent on discharge 35 minutes
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
## 2022-04-29 NOTE — DISCHARGE SUMMARY
Patient:   Danyelle Chi            MRN: [id number]            FIN: [id number]               Age:   39 years     Sex:  Female     :  1980   Associated Diagnoses:   None   Author:   Thao BOWEN, Mikie      Results Review   SEE TODAY'S PN      Physical Examination      Vital Signs (last 24 hrs)_____  Last Charted___________  Temp Oral     36.6 DegC  (MAR 03 07:35)  Heart Rate Peripheral   90 bpm  (MAR 03 07:35)  Resp Rate         20 br/min  (MAR 03 07:35)  SBP      H 175mmHg  (MAR 03 07:35)  DBP      H 97mmHg  (MAR 03 07:35)  SpO2      99 %  (MAR 03 07:35)  Weight      153 kg  (MAR 02 12:20)  Height      161 cm  (MAR 02 12:20)  BMI      59.03  (MAR 02 12:20)

## 2022-04-29 NOTE — H&P
Patient:   Danyelle Chi            MRN: [id number]            FIN: [id number]               Age:   38 years     Sex:  Female     :  1980   Associated Diagnoses:   None   Author:   Martine Velazquez MD      Basic Information   Source of history:  Self, Family member, Medical record.    Present at bedside:  Medical personnel.    Referral source:  Emergency department.    History limitation:  None.    Provider information/ cc:    PCP: [facility]  ADVANCED DIRECTIVES: NONE  CODE STATUS: FULL.       Chief Complaint   2019 4:57 CST       SOB, PT WAS HERE 2 DAYS AGO FOR SAME, INITAL SP02 96% WITH RR 38      History of Present Illness   38-year-old -American female presents to the ED via EMS with complaints of shortness of breath, difficulty breathing, and acute asthma exacerbation.  Patient reports she was seen in the ED 2 days ago for similar complaints, evaluated workup done and subsequently discharged home.  Patient reports her symptoms progressively got worse which prompted return to ED for further evaluation.  Asked medical history includes asthma, HTN, DM, tobacco abuse. Potassium 3.2, glucose 153; other indices unremarkable.  Chest x-ray done revealed no evidence of acute disease.  Patient was noted to be hypertensive, tachypnea, with significant amount of wheezing on presentation which she did require supplemental O2 therapy, but now has been weaned off.  She did receive nebulizer treatments, magnesium, and IV steroid therapy in the ED which provided some relief, however she still remained with wheezing and difficulty breathing.  Patient is admitted to hospital medicine services for further management.  VS /104 pulse 103 respirations 18 temperature 36.6°C O2 saturation 98% on room air      Review of Systems   Except as document, all other systems reviewed and negative      Health Status   Allergies:    Allergic Reactions (Selected)  Severity Not Documented  Iodine containing  compounds- Throat swelling.  Mushroom- Throat swelling.  Shellfish- Allergy.   Current medications:  (Selected)   Inpatient Medications  Ordered  DuoNeb: 3 mL, form: Soln, NEB, q4hr PRN for shortness of breath or wheezing, first dose 01/13/19 12:41:00 CST  Fioricet oral tablet: 1 tab(s), form: Tab, Oral, q4hr PRN for headache, first dose 01/13/19 11:40:00 CST  Sodium Chloride 0.9% intravenous solution 1,000 mL: 1,000 mL, 1,000 mL, IV, 125 mL/hr, start date 01/13/19 7:04:00 CST  Vistaril: 50 mg, form: Cap, Oral, q8hr PRN for as needed for anxiety, first dose 01/13/19 12:40:00 CST  acetaminophen: 650 mg, form: Tab, Oral, q6hr PRN for fever, first dose 01/13/19 7:04:00 CST  cloniDINE 0.2 mg oral tablet: 0.2 mg, form: Tab, Oral, Once, first dose 11/30/16 15:13:00 CST, stop date 11/30/16 15:13:00 CST, STAT, 24  methylPREDNISolone sodium succinate 125 mg preservative-free injection: 60 mg, form: Injection, IV Push, q8hr, first dose 01/13/19 8:00:00 CST  Documented Medications  Documented  ALBUTEROL SULFATE .083 % NEBU: 2.5 mg = 3 mL, NEB, q6hr, PRN PRN as needed for wheezing  Arnuity Ellipta: INH, q24hr, 0 Refill(s)  Benadryl: 25MG-50MG PRN AND AT NIGHT PO, 0 Refill(s)  Singulair 10 mg oral TABLET: 10 mg = 1 tab(s), Oral, Daily, 0 Refill(s)  Template Non-Formulary Med: UNKNOWN ABX FOR UTI FOR 2 MORE DAYS, 0 Refill(s)  Vistaril: 50MG-100MG QID AND PRN PO FOR ANXIETY, 0 Refill(s)  Zofran 4 mg oral tablet: 4 mg = 1 tab(s), Oral, q8hr, PRN PRN as needed for nausea/vomiting, 0 Refill(s)  amlodipine 10 mg oral tablet: 10 mg = 1 tab(s), Oral, Daily, # 30 tab(s), 0 Refill(s)  metformin 500 mg oral tablet: 500 mg = 1 tab(s), Oral, BID, 0 Refill(s)      Histories   Past Medical History:   Asthma, HTN, DM, seizure in the past, headaches   Family History:   Mother-DM, HTN  Father-eczema, asthma   Procedure history:   Hernia repair ×2, cholecystectomy, D&C   Social History     Drinks alcohol socially  Denies any illicit drug  use  Smokes cigarettes socially  Lives with family.        Physical Examination      Vital Signs (last 24 hrs)_____  Last Charted___________  Heart Rate Peripheral   H 105bpm  (JAN 13 11:54)  Resp Rate         20 br/min  (JAN 13 11:54)  SBP      H 143mmHg  (JAN 13 11:00)  DBP      83 mmHg  (JAN 13 11:00)  SpO2      99 %  (JAN 13 11:54)  Weight      141 kg  (JAN 13 04:57)   General:  Alert and oriented, Mild distress, appears odler than stated age, obese, mild SOB noted, family at bedside.         Appearance: Morbidly obese.         Signs of distress: Difficulty breathing.    Eye:  Pupils are equal, round and reactive to light, Extraocular movements are intact, Vision unchanged.    HENT:  Normocephalic, Oral mucosa is moist, No pharyngeal erythema.    Neck:  Supple, Non-tender.    Respiratory:  Breath sounds are equal, Symmetrical chest wall expansion.         Respirations: Labored (mild).         Breath sounds: Bilateral, Lower lobe, Base, Diminished.         Breath sounds: Bilateral, Inspiratory wheezes, Expiratory wheezes.    Cardiovascular:  103  beats per minute, Regular rhythm, S1, S2, No gallop, Tachycardia, Normal peripheral perfusion.         Capillary refill: Less than 2 seconds.    Gastrointestinal:  Soft, Non-tender, Non-distended, Normal bowel sounds.         Abdomen: Obese.    Genitourinary:  No costovertebral angle tenderness.    Musculoskeletal:  Moves upper and lower extremities freely and on command; generalized weakness.    Integumentary:  Warm, Dry.    Neurologic:  Alert, Oriented, Normal sensory, Normal motor function, No focal deficits.    Psychiatric:  Cooperative, Appropriate mood & affect.    Cognition and Speech:  Speech clear and coherent.       Review / Management   Results review:     Labs (Last four charted values)  WBC                  7.4 (JAN 13)   Hgb                  12.1 (JAN 13)   Hct                  38.1 (JAN 13)   Plt                  271 (JAN 13)   Na                    139 (JAN 13)   K                    L 3.2 (JAN 13)   CO2                  26.0 (JAN 13)   Cl                   105 (JAN 13)   Cr                   0.86 (JAN 13)   BUN                  L 5.0 (JAN 13)   Glucose Random       H 153 (JAN 13) .    Laboratory Results   Today's Lab Results : PowerNote Discrete Results   1/13/2019 6:10 CST       Sample ABG                art                             Treatment                 room air                             Site                      Radial Lt                             pH Art                    7.530  HI                             pCO2 Art                  26.0 mmHg  LOW                             pO2 Art                   134.0 mmHg  HI                             HCO3 Art                  21.7 mmol/L  LOW                             CO2 Totl Art              22.5 mmol/L                             O2 Sat Art                99.3 %  HI                             D base                    0.1                             THB ABG                   12.2 gm/dL                             CO Hgb                    1.4 %  NA                             Met Hgb Art               1.2 %                             O2 Hgb                    97.2 %  HI                             CaO2                      16.9 mL/dL                             Ionized Calcium           1.13 mmol/L                             Sodium RT                 137.0 mmol/L                             Potassium RT              3.00 mmol/L  LOW                             Allens                    N/A                             Setting 1 ABG             room air                             Setting 2 ABG             -                             Setting 3 ABG             -                             Setting 4 ABG             -    1/13/2019 5:14 CST       POC BNP iSTAT             <15 pg/mL    1/13/2019 5:13 CST       POC Troponin              0.00 ng/mL    1/13/2019 5:04 CST       WBC                        7.4 x10(3)/mcL                             RBC                       4.68 x10(6)/mcL                             Hgb                       12.1 gm/dL                             Hct                       38.1 %                             Platelet                  271 x10(3)/mcL                             MCV                       81.4 fL                             MCH                       25.9 pg  LOW                             MCHC                      31.8 gm/dL  LOW                             RDW                       14.3 %                             MPV                       10.6 fL                             Abs Neut                  4.23 x10(3)/mcL                             Neutro Auto               57 %  NA                             Lymph Auto                30 %  NA                             Mono Auto                 8 %  NA                             Eos Auto                  4 %  NA                             Abs Eos                   0.3 x10(3)/mcL                             Basophil Auto             0 %  NA                             Abs Neutro                4.23 x10(3)/mcL                             Abs Lymph                 2.2 x10(3)/mcL                             Abs Mono                  0.6 x10(3)/mcL                             Abs Baso                  0.0 x10(3)/mcL                             Sodium Lvl                139 mmol/L                             Potassium Lvl             3.2 mmol/L  LOW                             Chloride                  105 mmol/L                             CO2                       26.0 mmol/L                             Calcium Lvl               8.4 mg/dL  LOW                             Glucose Lvl               153 mg/dL  HI                             BUN                       5.0 mg/dL  LOW                             Creatinine                0.86 mg/dL                             eGFR-AA                   >60 mL/min/1.73 m2  NA                              eGFR-MAX                  >60 mL/min/1.73 m2  NA                             Bili Total                0.3 mg/dL                             Bili Direct               0.10 mg/dL                             Bili Indirect             0.20 mg/dL                             AST                       15 unit/L                             ALT                       23 unit/L                             Alk Phos                  76 unit/L                             Total Protein             6.9 gm/dL                             Albumin Lvl               3.40 gm/dL                             Globulin                  3.50 gm/dL                             A/G Ratio                 1.0  NA        Chest x-ray results   Reviewed radiologist's report,     CXR 1 view  Reason For Exam  Congestion    Radiology Report  Indication: Congestion     Findings: Compared to 12/20/2018. Heart size is normal and lungs are  clear bilaterally. Pulmonary vasculature is normal.     IMPRESSION: No evidence of acute disease.       Signature Line  Electronically Signed By: Shira BOWEN, Blake RG  Date/Time Signed: 01/13/2019 08:08   Cardiac Monitor:  Rate  103 beats per minute.    Documentation reviewed:  Reviewed prior records, Reviewed home medications.    Condition:  Fair.       Impression and Plan   Diagnosis       IMPRESSION:  Respiratory failure - with hypoxia, resolving  Acute asthma exacerbation  Wheezing  Exertional dyspnea  HTN- accelerated  DM type II non-insulin dependent- uncontrolled  Tobacco abuse  Weakness    PLAN:  Scheduled neb treatments. Supplemental Oxygen therapy. Monitor pulse oximetry. Labs in the am. Resume home medication as deemed necessary. Nicotine patch daily if desires. PRN anti-hypertensive per parameters. VS q 4 hours. Continue with IV steroid therapy. Accuchecks with sliding scale. GI/DVT prophylaxis      FULL CODE STATUS  PCP: Evelyn LENZ APRN, FNP, discussed the  case with Dr. Martine Velazquez. .     Orders     Orders   Patient Care:  Unconscious patient: Blood glucose <70, give 1 mg glucagon IM, and place IV access; move to protocol above. (Order): 1/13/2019 12:32 CST, Unconscious patient: Blood glucose <70, give 1 mg glucagon IM, and place IV access; move to protocol above.  Unconscious patient: Repeat BG every 15 - 20 minutes following episode until BG > 70 per protocol. (Order): 1/13/2019 12:32 CST, Unconscious patient: Repeat BG every 15 - 20 minutes following episode until BG > 70 per protocol.  Unconscious patient: Blood glucose <50 mg/dl, give 50 ml of D50W IVP and repeat as ordered until patient wakes up or BG > 70 mg/dl. (Order): 1/13/2019 12:32 CST, Unconscious patient: Blood glucose <50 mg/dl, give 50 ml of D50W IVP and repeat as ordered until patient wakes up or BG > 70 mg/dl.  Unconscious patient: Blood glucose 50 - 69, give 25 ml of D50W IVP and repeat as ordered until patient wakes up or BG > 70mg/dl. (Order): 1/13/2019 12:32 CST, Unconscious patient: Blood glucose 50 - 69, give 25 ml of D50W IVP and repeat as ordered until patient wakes up or BG > 70mg/dl.  Unconscious patient: Blood glucose 70 - 100 mg/dl, give 25 ml of D50W IVP but look for other source of altered mental status. (Order): 1/13/2019 12:32 CST, Unconscious patient: Blood glucose 70 - 100 mg/dl, give 25 ml of D50W IVP but look for other source of altered mental status.  Conscious patient: For blood glucose <70 repeat BG every 15-20 minutes and treat again if hypoglycemia persists. (Order): 1/13/2019 12:32 CST, Conscious patient: For blood glucose <70 repeat BG every 15-20 minutes and treat again if hypoglycemia persists.  Conscious patient: Blood glucose <45 mg/dl and IV access, give 25 ml D50W IVP. If no IV access available, 1 mg glucagon IM and 15 grams of fast-acting carbohydrate. Notify treating MD for further orders. (Order): 1/13/2019 12:32 CST, Conscious patient: Blood glucose <45  mg/dl and IV access, give 25 ml D50W IVP. If no IV access available, 1 mg glucagon IM and 15 grams of fast-acting carbohydrate. Notify treating MD for further orders.  Conscious patient: Blood glucose 45-69 mg/dl; give 15 gm of fast acting carbs (4 oz. juice, regular soft drink, glucose tabs). Rationale: Low blood sugar; patient needs fast acting sugar. (Order): 1/13/2019 12:32 CST, Conscious patient: Blood glucose 45-69 mg/dl; give 15 gm of fast acting carbs (4 oz. juice, regular soft drink, glucose tabs). Rationale: Low blood sugar; patient needs fast acting sugar.  Conscious patient: Blood glucose  mg/dl, having symptoms, give light snack such as mat crackers and milk. Rationale: Symptomatic patient needs protein and carbs for stability. (Order): 1/13/2019 12:32 CST, Conscious patient: Blood glucose  mg/dl, having symptoms, give light snack such as mat crackers and milk. Rationale: Symptomatic patient needs protein and carbs for stability.  Peripheral IV Insertion (Order): 1/13/2019 12:32 CST  Blood glucose equal to or greater than 400 (Order): 1/13/2019 12:32 CST, Blood glucose equal to or greater than 400  Blood glucose equal to or less than 60 (Order): 1/13/2019 12:32 CST, Blood glucose equal to or less than 60  Do not arbitrarily hold insulin without calling physician (Order): 1/13/2019 12:32 CST, Do not arbitrarily hold insulin without calling physician  Blood Glucose Monitoring POC (Order): 1/13/2019 12:32 CST, AC & HS, 1/13/2019 16:00 CST  Pharmacy:  glucagon (Order): 1 mg, IM, q10min PRN for blood glucose, first dose 1/13/2019 12:32 CST, Unconscious patient: Patient with NO IV access available and BG < 70 mg/dl.  Dextrose 50% and Water  (50 mL vial/syringe) (Order): 50 mL, IV Push, As Directed PRN for blood glucose, Infuse over: 5 minute(s), first dose 1/13/2019 12:32 CST, Unconscious patient: Repeat as ordered per protocol.  Dextrose 50% and Water  (50 mL vial/syringe) (Order): 25 mL,  IV Push, As Directed PRN for blood glucose, Infuse over: 5 minute(s), first dose 1/13/2019 12:32 CST, Unconscious patient: Repeat as ordered per protocol.  Dextrose 50% and Water  (50 mL vial/syringe) (Order): 25 mL, IV Push, Once PRN for blood glucose, Infuse over: 5 minute(s), first dose 1/13/2019 12:32 CST, Unconscious patient: Look for other source of altered mental status.  glucagon (Order): 1 mg, IM, q10min PRN for blood glucose, first dose 1/13/2019 12:32 CST, Conscious Patient with NO IV access available and BG < 45 mg/dl.  Dextrose 50% in Water intravenous solution (Order): 25 mL, IV Push, As Directed PRN for blood glucose, Infuse over: 5 minute(s), first dose 1/13/2019 12:32 CST, Conscious patient.  insulin lispro (Order): 2-14 units, Subcutaneous, As Directed PRN for blood glucose, first dose 1/13/2019 12:32 CST.     Orders   Laboratory:  CMP (Order): Routine collect, 1/14/2019 5:00 CST, Blood, Lab Collect, 1/14/2019 5:00 CST  CBC wo/Diff (Order): Routine collect, 1/14/2019 5:00 CST, Blood, Lab Collect, 1/14/2019 5:00 CST.     Education and Follow-up:       Counseled: Patient, Family, Regarding diagnosis, Regarding treatment, Regarding medications.       Professional Services   I, Dr. Martine Velazquez assumed care of the patient on 1/13/2019 at 1300 hrs.    For this patient encounter, I reviewed the midlevel provider Ms. Felix Solorzano's documentation, treatment plan, and medical decision making, and I had face-to-face time with this patient.  History was reviewed in detail.  Patient was still in the ED at the time of my visit.  Her  was at bedside at the time.  Laying in bed and complaining of shortness of breath.  She was also complaining of a headache and had received a dose of Toradol and Tylenol however without any relief.  She states that she usually gets headaches when she is put on steroids.  Advised that I will order some Fioricet    History: Reviewed and noted in detail    Physical Exam:  "Agree with above    Medical decision Making:   Continue Solu-Medrol with neb treatments will be changed to duo nebs from albuterol  Fioricet as needed for headaches  Insulin sliding scale with Accu-Cheks before meals and at bedtime  Patient does not admit to any smoking or alcohol however  tells me later that she has multiple personalities and calls it" different spirits, stating that 1 of her spirits likes to smoke, Chau and drink".  He is requesting for pastoral services to try and barragan the spirits off.    Case discussed with the nurse practitioner and agree with the rest of the assessment and plan stated above   "

## 2022-04-29 NOTE — DISCHARGE SUMMARY
Patient:   Danyelle Chi            MRN: [id number]            FIN: [id number]               Age:   38 years     Sex:  Female     :  1980   Associated Diagnoses:   None   Author:   Mikie Osuna MD      Discharge Information      Discharge Summary Information   Admitted  3/31/2019   Discharged  2019   Admitting physician     Mikie Osuna MD.     Admitting diagnosis (1. ASTHMA EXACERBATION  2. VAGINAL BLEEDING   3. TACHYCARDIA  4. PREDIABETS  5. OBESITY  6. ANXIETY  7. HIRSUTISM)      Physical Examination   General:  Alert and oriented.    Eye:  Pupils are equal, round and reactive to light, Extraocular movements are intact, Normal conjunctiva, Vision unchanged.    HENT:  Normocephalic, Normal hearing, Oral mucosa is moist, No pharyngeal erythema.    Neck:  Supple, Non-tender, No carotid bruit.    Respiratory:  Lungs are clear to auscultation, Respirations are non-labored, Breath sounds are equal, No chest wall tenderness.    Cardiovascular:  Normal rate, Regular rhythm, No murmur, No gallop.    Gastrointestinal:  Soft, Non-tender, Non-distended, Normal bowel sounds, No organomegaly.    Genitourinary:  No costovertebral angle tenderness, No inguinal tenderness, No urethral discharge.       Vital Signs (last 24 hrs)_____  Last Charted___________  Temp Oral     37 DegC  ( 07:29)  Heart Rate Peripheral   100 bpm  ( 08:36)  Resp Rate         20 br/min  ( 08:26)  SBP      138 mmHg  ( 07:29)  DBP      H 94mmHg  ( 07:29)  SpO2      95 %  ( 08:26)   Lymphatics:  No lymphadenopathy neck, axilla, groin.    Musculoskeletal:  Normal range of motion, Normal strength.    Neurologic:  Alert, Oriented, Normal sensory, Normal motor function, No focal deficits, Cranial Nerves II-XII are grossly intact.    Psychiatric:  Cooperative, Appropriate mood & affect, Normal judgment, Non-suicidal.       Hospital Course   Chief Complaint   3/31/2019 20:44 CDT      C/O ABD PAIN X 3  DAYS. SPOTTING TODAY. ALSO C/O SOB AND HEADACHE. ALBUTEROL AND FENTANYL 100 MCG PER EMS      History of Present Illness   37 Y/O AAF RECENTLY ESTABLISHED CARE WITH THE GROUP WITH MEDICAL HISTORY INCLUDING PREDIABETES, HTN, OBESITY, ASTHMA, ANXIETY PRESENTED TO THE ED WITH COMPLAINTS OF WORSENING WHEEZE AND SOB AND TACHYCARDIC AND TACHYPNEIC ON ARRIVAL NECCESITATING ADMISION FOR ASTHMA EXACERBATION ON DUONEBS AND IV STEROIDS, REPORTED TO HAVE ABDOMINAL PAIN WITH VAGINAL BLEEDING AND URIN EPREGNANCY POS AT HOME AND SERUM HCG NEGATIVE IN THE ED.      1. ASTHMA EXACERBATION  2. VAGINAL BLEEDING   3. TACHYCARDIA  4. PREDIABETS  5. OBESITY  6. ANXIETY  7. HIRSUTISM    PLAN :  IV STEROIDS AND DUONEBS  RESUME HOME MEDS  VAGINAL US  SCDS DFOR DVT PPX    VAGINAL US NORMAL  BREATHING BETTER  DC HOME ON PO STEROIDS  DC TIME 32 MIN
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
## 2022-04-29 NOTE — H&P
Patient:   Danyelle Chi            MRN: [id number]            FIN: [id number]               Age:   37 years     Sex:  Female     :  1980   Associated Diagnoses:   None   Author:   Bj Monteiro MD      Basic Information   Time Seen:  Date & Time 3/27/2018 13:15:00.    Source of history:  Self.    Referral source:  Emergency department.    History limitation:  None.    Advance directive:  None.    Provider information/ cc:  Primary care:  Chuy Langford DO       Chief Complaint   SOB, cough      History of Present Illness   Ms. Chi is a 37 year old morbidly obese AAF with a history of asthma. Reports a 7 day history of SOB and nonproductive cough. Denies fever or chills. Pt was admitted to [facility] twice since onset of symptoms, treated with antibiotics, corticosteroids, and bronchodilators with no relief of symptoms. Labs today are unremarkable. Chest CT shows nothing acute and is negative for PE. Today received IV corticosteroids and bronchodilators in ED. Feels better but continues to have some persistent wheezing.      Review of Systems   Except as documented, all other systems were reviewed and are negative      Health Status   Current medications:  (Selected)   Inpatient Medications  Ordered  NS (0.9% Sodium Chloride) Infusion 500 mL: 500 mL, 500 mL, IV, 500 mL/hr, start date 18 9:02:00 CDT  cloniDINE 0.2 mg oral tablet: 0.2 mg, form: Tab, Oral, Once, first dose 16 15:13:00 CST, stop date 16 15:13:00 CST, STAT, 24  Documented Medications  Documented  ALBUTEROL SULFATE .083 % NEBU:   HYDROCO/APAP TAB 5-325MG:   HYDROXYZINE PAMOATE 50 MG CAPS: 50 mg = 1 cap(s), Oral, TID  METHYLPREDNISOLONE DOSE PACK 4 MG TBPK:   amlodipine 10 mg oral tablet: 10 mg = 1 tab(s), Oral, Daily, # 30 tab(s), 0 Refill(s)      Histories     Past Medical History: Asthma, HTN  Past Surgical History: D&C  Family History: Negative in regards to HPI.  Social History:  No alcohol, tobacco or illicit  drug use. LMP one year ago. G14, P7, multiple miscarriages.       Physical Examination      Vital Signs (last 24 hrs)_____  Last Charted___________  Heart Rate Peripheral   76 bpm  (MAR 27 12:30)  Resp Rate         18 br/min  (MAR 27 12:30)  SBP      H 151mmHg  (MAR 27 12:30)  DBP      H 106mmHg  (MAR 27 12:30)  SpO2      94 %  (MAR 27 12:30)     General:  Alert and oriented, No acute distress, obese.    Cognition and Speech:  Oriented, Speech clear and coherent.    HENT:  Normocephalic, Normal hearing, Oral mucosa is moist.    Eye:  Pupils are equal, round and reactive to light, Normal conjunctiva.    Neck:  Supple, No carotid bruit, No jugular venous distention.    Respiratory:  Respirations are non-labored, Breath sounds are equal, few expiratory wheezes.    Cardiovascular:  Normal rate, Regular rhythm, No murmur, No edema.    Gastrointestinal:  Soft, Non-tender, Non-distended, Normal bowel sounds.    Integumentary:  Warm, Dry, Intact.    Musculoskeletal:  Normal strength, No tenderness, No swelling.    Neurologic:  Alert, Oriented, Normal sensory, No focal deficits.    Psychiatric:  Cooperative, Appropriate mood & affect, Normal judgment.       Review / Management   Laboratory Results   Today's Lab Results : PowerNote Discrete Results   3/27/2018 9:22 CDT       POC Troponin              0.00 ng/mL    3/27/2018 9:07 CDT       WBC                       8.9 x10(3)/mcL                             Hgb                       12.4 gm/dL                             Hct                       38.6 %                             Platelet                  231 x10(3)/mcL                             MCV                       81.1 fL                             MCH                       26.1 pg  LOW                             PT                        13.1 second(s)                             INR                       0.96                             PTT                       29.6 second(s)                             D-Dimer                    763 ng/ml FEU  HI                             Sodium Lvl                136 mmol/L                             Potassium Lvl             3.6 mmol/L                             Chloride                  100 mmol/L                             CO2                       30.0 mmol/L                             Calcium Lvl               9.1 mg/dL                             Glucose Lvl               143 mg/dL  HI                             BUN                       10.0 mg/dL                             Creatinine                0.88 mg/dL                             Bili Total                0.3 mg/dL                             Bili Direct               0.10 mg/dL                             Bili Indirect             0.20 mg/dL                             AST                       12 unit/L  LOW                             ALT                       14 unit/L                             Albumin Lvl               3.50 gm/dL                             BNP                       <5 pg/mL    3/27/2018 9:02 CDT       Mycoplasma IgM            Negative        03/27/18 CHEST CT:     FINDINGS: Continuous axial images were performed through the chest  from the level of the lung apices through the adrenals following  administration of IV contrast in a pulmonary embolus protocol. Images  are displayed in soft tissue and pulmonary window settings.  Reformatted coronal and sagittal images by MIP reconstruction were  also obtained. This study is compared to a prior CT dated 8/5/2015.     The lungs are clear bilaterally with no pleural effusion, lung  consolidation or suspicious pulmonary nodules identified. No  pathologic lymph node enlargement is visible in the pulmonary dolores,  mediastinum or axilla bilaterally. Heart size is normal. No chest wall  abnormalities are appreciated. There is grossly stable appearance of  an irregular cystic structure relatively enlarging the left thyroid  lobe inferiorly and extending  through the thoracic inlet. The  visualized structures of the upper abdomen appear unremarkable except  for evidence of prior cholecystectomy.     No filling defects are identified in main or segmental pulmonary  arteries to indicate evidence of pulmonary embolus.     IMPRESSION:  1. No evidence of pulmonary thromboembolism.  2. No acute pulmonary disease or adenopathy is identified.  3. Stable appearance of an irregular cystic structure in the left  thyroid lobe extending inferiorly through the thoracic inlet compared  to 2015.            Impression and Plan     Asthma exacerbation   - appears much improved. will continue IV corticosteroids and nebulized bronchodilators for now.     Primary HTN - resume home meds    Morbid obesity    I, Kayley Borjas NP, discussed this case with Dr. LATRICIA Monteiro.
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
## 2022-07-28 ENCOUNTER — HOSPITAL ENCOUNTER (EMERGENCY)
Facility: HOSPITAL | Age: 42
Discharge: HOME | End: 2022-07-29
Attending: EMERGENCY MEDICINE
Payer: MEDICAID

## 2022-07-28 DIAGNOSIS — R06.02 SOB (SHORTNESS OF BREATH): ICD-10-CM

## 2022-07-28 DIAGNOSIS — J45.901 EXACERBATION OF ASTHMA, UNSPECIFIED ASTHMA SEVERITY, UNSPECIFIED WHETHER PERSISTENT: Primary | ICD-10-CM

## 2022-07-28 PROCEDURE — 96374 THER/PROPH/DIAG INJ IV PUSH: CPT

## 2022-07-28 PROCEDURE — 99284 EMERGENCY DEPT VISIT MOD MDM: CPT | Mod: 25

## 2022-07-29 VITALS
RESPIRATION RATE: 18 BRPM | HEIGHT: 63 IN | HEART RATE: 70 BPM | WEIGHT: 293 LBS | DIASTOLIC BLOOD PRESSURE: 108 MMHG | OXYGEN SATURATION: 100 % | SYSTOLIC BLOOD PRESSURE: 189 MMHG | TEMPERATURE: 98 F | BODY MASS INDEX: 51.91 KG/M2

## 2022-07-29 LAB
ALBUMIN SERPL-MCNC: 3.7 GM/DL (ref 3.5–5)
ALBUMIN/GLOB SERPL: 1 RATIO (ref 1.1–2)
ALP SERPL-CCNC: 78 UNIT/L (ref 40–150)
ALT SERPL-CCNC: 9 UNIT/L (ref 0–55)
AST SERPL-CCNC: 11 UNIT/L (ref 5–34)
BASOPHILS # BLD AUTO: 0.03 X10(3)/MCL (ref 0–0.2)
BASOPHILS NFR BLD AUTO: 0.4 %
BILIRUBIN DIRECT+TOT PNL SERPL-MCNC: 0.5 MG/DL
BNP BLD-MCNC: <10 PG/ML
BUN SERPL-MCNC: 9.7 MG/DL (ref 7–18.7)
CALCIUM SERPL-MCNC: 9.3 MG/DL (ref 8.4–10.2)
CHLORIDE SERPL-SCNC: 105 MMOL/L (ref 98–107)
CO2 SERPL-SCNC: 23 MMOL/L (ref 22–29)
CREAT SERPL-MCNC: 1.07 MG/DL (ref 0.55–1.02)
EOSINOPHIL # BLD AUTO: 0.23 X10(3)/MCL (ref 0–0.9)
EOSINOPHIL NFR BLD AUTO: 3.2 %
ERYTHROCYTE [DISTWIDTH] IN BLOOD BY AUTOMATED COUNT: 13.9 % (ref 11.5–17)
GLOBULIN SER-MCNC: 3.7 GM/DL (ref 2.4–3.5)
GLUCOSE SERPL-MCNC: 240 MG/DL (ref 74–100)
HCT VFR BLD AUTO: 39.5 % (ref 37–47)
HGB BLD-MCNC: 13.3 GM/DL (ref 12–16)
IMM GRANULOCYTES # BLD AUTO: 0.02 X10(3)/MCL (ref 0–0.04)
IMM GRANULOCYTES NFR BLD AUTO: 0.3 %
INR BLD: 0.96 (ref 0–1.3)
LYMPHOCYTES # BLD AUTO: 1.97 X10(3)/MCL (ref 0.6–4.6)
LYMPHOCYTES NFR BLD AUTO: 27.8 %
MCH RBC QN AUTO: 26.1 PG (ref 27–31)
MCHC RBC AUTO-ENTMCNC: 33.7 MG/DL (ref 33–36)
MCV RBC AUTO: 77.6 FL (ref 80–94)
MONOCYTES # BLD AUTO: 0.6 X10(3)/MCL (ref 0.1–1.3)
MONOCYTES NFR BLD AUTO: 8.5 %
NEUTROPHILS # BLD AUTO: 4.2 X10(3)/MCL (ref 2.1–9.2)
NEUTROPHILS NFR BLD AUTO: 59.8 %
NRBC BLD AUTO-RTO: 0 %
PLATELET # BLD AUTO: 312 X10(3)/MCL (ref 130–400)
PMV BLD AUTO: 10.6 FL (ref 7.4–10.4)
POTASSIUM SERPL-SCNC: 3.8 MMOL/L (ref 3.5–5.1)
PROT SERPL-MCNC: 7.4 GM/DL (ref 6.4–8.3)
PROTHROMBIN TIME: 12.7 SECONDS (ref 12.5–14.5)
RBC # BLD AUTO: 5.09 X10(6)/MCL (ref 4.2–5.4)
SODIUM SERPL-SCNC: 138 MMOL/L (ref 136–145)
TROPONIN I SERPL-MCNC: <0.01 NG/ML (ref 0–0.04)
WBC # SPEC AUTO: 7.1 X10(3)/MCL (ref 4.5–11.5)

## 2022-07-29 PROCEDURE — 85610 PROTHROMBIN TIME: CPT | Performed by: STUDENT IN AN ORGANIZED HEALTH CARE EDUCATION/TRAINING PROGRAM

## 2022-07-29 PROCEDURE — 94761 N-INVAS EAR/PLS OXIMETRY MLT: CPT

## 2022-07-29 PROCEDURE — 93010 ELECTROCARDIOGRAM REPORT: CPT | Mod: ,,, | Performed by: INTERNAL MEDICINE

## 2022-07-29 PROCEDURE — 80053 COMPREHEN METABOLIC PANEL: CPT | Performed by: STUDENT IN AN ORGANIZED HEALTH CARE EDUCATION/TRAINING PROGRAM

## 2022-07-29 PROCEDURE — 63600175 PHARM REV CODE 636 W HCPCS: Performed by: EMERGENCY MEDICINE

## 2022-07-29 PROCEDURE — 84484 ASSAY OF TROPONIN QUANT: CPT | Performed by: STUDENT IN AN ORGANIZED HEALTH CARE EDUCATION/TRAINING PROGRAM

## 2022-07-29 PROCEDURE — 25000003 PHARM REV CODE 250: Performed by: EMERGENCY MEDICINE

## 2022-07-29 PROCEDURE — 83880 ASSAY OF NATRIURETIC PEPTIDE: CPT | Performed by: STUDENT IN AN ORGANIZED HEALTH CARE EDUCATION/TRAINING PROGRAM

## 2022-07-29 PROCEDURE — 25000242 PHARM REV CODE 250 ALT 637 W/ HCPCS: Performed by: EMERGENCY MEDICINE

## 2022-07-29 PROCEDURE — 36415 COLL VENOUS BLD VENIPUNCTURE: CPT | Performed by: STUDENT IN AN ORGANIZED HEALTH CARE EDUCATION/TRAINING PROGRAM

## 2022-07-29 PROCEDURE — 94640 AIRWAY INHALATION TREATMENT: CPT

## 2022-07-29 PROCEDURE — 93010 EKG 12-LEAD: ICD-10-PCS | Mod: ,,, | Performed by: INTERNAL MEDICINE

## 2022-07-29 PROCEDURE — 85025 COMPLETE CBC W/AUTO DIFF WBC: CPT | Performed by: STUDENT IN AN ORGANIZED HEALTH CARE EDUCATION/TRAINING PROGRAM

## 2022-07-29 PROCEDURE — 93005 ELECTROCARDIOGRAM TRACING: CPT

## 2022-07-29 RX ORDER — METHYLPREDNISOLONE SOD SUCC 125 MG
125 VIAL (EA) INJECTION
Status: COMPLETED | OUTPATIENT
Start: 2022-07-29 | End: 2022-07-29

## 2022-07-29 RX ORDER — METHYLPREDNISOLONE 4 MG/1
TABLET ORAL
Qty: 1 EACH | Refills: 0 | Status: SHIPPED | OUTPATIENT
Start: 2022-07-29 | End: 2022-08-19

## 2022-07-29 RX ORDER — IPRATROPIUM BROMIDE AND ALBUTEROL SULFATE 2.5; .5 MG/3ML; MG/3ML
3 SOLUTION RESPIRATORY (INHALATION)
Status: COMPLETED | OUTPATIENT
Start: 2022-07-29 | End: 2022-07-29

## 2022-07-29 RX ORDER — ALPRAZOLAM 0.5 MG/1
0.5 TABLET ORAL
Status: COMPLETED | OUTPATIENT
Start: 2022-07-29 | End: 2022-07-29

## 2022-07-29 RX ADMIN — ALPRAZOLAM 0.5 MG: 0.5 TABLET ORAL at 12:07

## 2022-07-29 RX ADMIN — METHYLPREDNISOLONE SODIUM SUCCINATE 125 MG: 125 INJECTION, POWDER, FOR SOLUTION INTRAMUSCULAR; INTRAVENOUS at 12:07

## 2022-07-29 RX ADMIN — IPRATROPIUM BROMIDE AND ALBUTEROL SULFATE 3 ML: 2.5; .5 SOLUTION RESPIRATORY (INHALATION) at 12:07

## 2022-07-29 NOTE — ED PROVIDER NOTES
"Encounter Date: 7/28/2022    SCRIBE #1 NOTE: I, Xiomara Desai, am scribing for, and in the presence of,  Augustine De La Cruz MD. I have scribed the following portions of the note - the EKG reading. Other sections scribed: HPI, ROS, PE.       History     Chief Complaint   Patient presents with    Shortness of Breath     Pt complains of SOB for several day with increased severity today, pt has hx asthma, audible wheezing. Pt reports  taking 5-7 breathing treatments at home ( albuterol 2.5).      43 y/o female, with a history of asthma and seizures, presents to the ED with complaints of worsening SOB "today", 7/28/22, with wheezing since 7/27/22. Pt denies fever and sputum productions. She states she was seen at an urgent care and told to come to ED due to elevated blood pressure and heart rate.  PT mentions she feels anxious. She notes  only mild improvement of symptoms after breathing treatments.     The history is provided by the patient. No  was used.   Shortness of Breath  This is a recurrent problem. The current episode started yesterday. The problem has not changed since onset.Associated symptoms include wheezing. Pertinent negatives include no fever, no sputum production, no chest pain, no vomiting, no abdominal pain and no leg swelling. Associated medical issues include asthma.     Review of patient's allergies indicates:   Allergen Reactions    Iodine      No past medical history on file.  No past surgical history on file.  No family history on file.     Review of Systems   Constitutional: Negative for chills, diaphoresis, fatigue and fever.   HENT: Negative for congestion and trouble swallowing.    Eyes: Negative for pain and discharge.   Respiratory: Positive for shortness of breath and wheezing. Negative for sputum production.    Cardiovascular: Negative for chest pain and leg swelling.   Gastrointestinal: Negative for abdominal pain, diarrhea, nausea and vomiting.   Genitourinary: " Negative for dysuria, flank pain, vaginal bleeding and vaginal discharge.   Musculoskeletal: Negative for arthralgias.   Skin: Negative for color change.   Neurological: Negative for syncope, speech difficulty and weakness.   Psychiatric/Behavioral: Negative for agitation and confusion. The patient is nervous/anxious.    All other systems reviewed and are negative.      Physical Exam     Initial Vitals [07/29/22 0003]   BP Pulse Resp Temp SpO2   (!) 177/129 103 (!) 28 98.4 °F (36.9 °C) 99 %      MAP       --         Physical Exam    Nursing note and vitals reviewed.  Constitutional: She appears well-developed and well-nourished. No distress.   HENT:   Head: Normocephalic and atraumatic.   Mouth/Throat: Oropharynx is clear and moist.   Eyes: Conjunctivae are normal. Pupils are equal, round, and reactive to light.   Neck: Neck supple.   Normal range of motion.  Cardiovascular: Normal rate, regular rhythm and normal heart sounds.   No murmur heard.  Pulmonary/Chest: No respiratory distress. She has wheezes (mild, expiratory; sounds more in upper airway).   Abdominal: Abdomen is soft. She exhibits no distension. There is no abdominal tenderness.   Musculoskeletal:         General: Normal range of motion.      Cervical back: Normal range of motion and neck supple.     Neurological: She is alert and oriented to person, place, and time. GCS score is 15. GCS eye subscore is 4. GCS verbal subscore is 5. GCS motor subscore is 6.   Skin: Skin is warm and dry. No rash noted.   Psychiatric: She has a normal mood and affect.         ED Course   Procedures  Labs Reviewed   COMPREHENSIVE METABOLIC PANEL - Abnormal; Notable for the following components:       Result Value    Glucose Level 240 (*)     Creatinine 1.07 (*)     Globulin 3.7 (*)     Albumin/Globulin Ratio 1.0 (*)     All other components within normal limits   CBC WITH DIFFERENTIAL - Abnormal; Notable for the following components:    MCV 77.6 (*)     MCH 26.1 (*)     MPV  10.6 (*)     All other components within normal limits   TROPONIN I - Normal   B-TYPE NATRIURETIC PEPTIDE - Normal   PROTIME-INR - Normal   CBC W/ AUTO DIFFERENTIAL    Narrative:     The following orders were created for panel order CBC auto differential.  Procedure                               Abnormality         Status                     ---------                               -----------         ------                     CBC with Differential[[id number]]        Abnormal            Final result                 Please view results for these tests on the individual orders.     EKG Readings: (Independently Interpreted)   Rhythm: Normal Sinus Rhythm. Heart Rate: 95. Ectopy: No Ectopy. Conduction: Normal. ST Segments: Normal ST Segments. T Waves: Normal. Clinical Impression: Normal Sinus Rhythm   7/29/22 @ 0010     ECG Results          EKG 12-lead (Final result)  Result time 07/29/22 08:07:32    Final result by Juaquin, Lab In [facility] (07/29/22 08:07:32)                 Narrative:    Test Reason : R06.02,    Vent. Rate : 095 BPM     Atrial Rate : 095 BPM     P-R Int : 182 ms          QRS Dur : 078 ms      QT Int : 380 ms       P-R-T Axes : 073 -03 040 degrees     QTc Int : 477 ms    Sinus rhythm with Fusion complexes  Otherwise normal ECG  Confirmed by Chantelle Tse MD ([id number]) on 7/29/2022 8:07:22 AM    Referred By:             Confirmed By:Chantelle Tse MD                            Imaging Results          X-Ray Chest AP Portable (Final result)  Result time 07/29/22 07:59:33    Final result by Carlos A Perez MD (07/29/22 07:59:33)                 Impression:      No acute cardiopulmonary process.      Electronically signed by: Carlos A Perez  Date:    07/29/2022  Time:    07:59             Narrative:    EXAMINATION:  XR CHEST AP PORTABLE    CLINICAL HISTORY:  sob;    TECHNIQUE:  Single view of the chest    COMPARISON:  11/04/2019    FINDINGS:  No focal opacification, pleural effusion, or  pneumothorax.    The cardiomediastinal silhouette is within normal limits.    No acute osseous abnormality.                              X-Rays:   Independently Interpreted Readings:   Chest X-Ray: Normal heart size.  No infiltrates.  No acute abnormalities.     Medications   albuterol-ipratropium 2.5 mg-0.5 mg/3 mL nebulizer solution 3 mL (3 mLs Nebulization Given 7/29/22 0053)   methylPREDNISolone sodium succinate injection 125 mg (125 mg Intravenous Given 7/29/22 0037)   ALPRAZolam tablet 0.5 mg (0.5 mg Oral Given 7/29/22 0037)     Medical Decision Making:   Clinical Tests:   Lab Tests: Ordered and Reviewed  Radiological Study: Ordered and Reviewed  Medical Tests: Ordered and Reviewed          Scribe Attestation:   Scribe #1: I performed the above scribed service and the documentation accurately describes the services I performed. I attest to the accuracy of the note.    Attending Attestation:           Physician Attestation for Scribe:  Physician Attestation Statement for Scribe #1: I, Augustine De La Cruz MD, reviewed documentation, as scribed by Xiomara Desai in my presence, and it is both accurate and complete.                      Clinical Impression:   Final diagnoses:  [R06.02] SOB (shortness of breath)  [J45.901] Exacerbation of asthma, unspecified asthma severity, unspecified whether persistent (Primary)          ED Disposition Condition    Discharge Stable        ED Prescriptions     Medication Sig Dispense Start Date End Date Auth. Provider    methylPREDNISolone (MEDROL DOSEPACK) 4 mg tablet Take taper per package directions 1 each 7/29/2022 8/19/2022 Augustine De La Cruz MD        Follow-up Information     Follow up With Specialties Details Why Contact Info    PCP  Call in 1 day  follow up with PCP ni 1-2 days           Augustine De La Cruz MD  07/30/22 [id number]